# Patient Record
Sex: MALE | Race: WHITE | Employment: FULL TIME | URBAN - METROPOLITAN AREA
[De-identification: names, ages, dates, MRNs, and addresses within clinical notes are randomized per-mention and may not be internally consistent; named-entity substitution may affect disease eponyms.]

---

## 2020-07-05 ENCOUNTER — HOSPITAL ENCOUNTER (OUTPATIENT)
Dept: PREADMISSION TESTING | Age: 30
Setting detail: SPECIMEN
Discharge: HOME | End: 2020-07-09
Payer: COMMERCIAL

## 2020-07-05 PROCEDURE — U0004 COV-19 TEST NON-CDC HGH THRU: HCPCS

## 2020-07-07 ENCOUNTER — TELEPHONE (OUTPATIENT)
Dept: PRIMARY CARE CLINIC | Age: 30
End: 2020-07-07

## 2020-07-07 LAB
SARS-COV-2, PCR: NOT DETECTED
SARS-COV-2, RAPID: NORMAL
SARS-COV-2: NORMAL
SOURCE: NORMAL

## 2020-07-08 ENCOUNTER — ANESTHESIA EVENT (OUTPATIENT)
Dept: OPERATING ROOM | Age: 30
End: 2020-07-08
Payer: COMMERCIAL

## 2020-07-09 ENCOUNTER — HOSPITAL ENCOUNTER (OUTPATIENT)
Age: 30
Setting detail: OUTPATIENT SURGERY
Discharge: HOME | End: 2020-07-09
Attending: ORTHOPAEDIC SURGERY | Admitting: ORTHOPAEDIC SURGERY
Payer: COMMERCIAL

## 2020-07-09 ENCOUNTER — ANESTHESIA (OUTPATIENT)
Dept: OPERATING ROOM | Age: 30
End: 2020-07-09
Payer: COMMERCIAL

## 2020-07-09 VITALS
TEMPERATURE: 96.8 F | SYSTOLIC BLOOD PRESSURE: 132 MMHG | HEART RATE: 85 BPM | BODY MASS INDEX: 29.62 KG/M2 | DIASTOLIC BLOOD PRESSURE: 97 MMHG | WEIGHT: 200 LBS | RESPIRATION RATE: 12 BRPM | OXYGEN SATURATION: 96 % | HEIGHT: 69 IN

## 2020-07-09 VITALS — DIASTOLIC BLOOD PRESSURE: 101 MMHG | OXYGEN SATURATION: 100 % | SYSTOLIC BLOOD PRESSURE: 131 MMHG | TEMPERATURE: 96.8 F

## 2020-07-09 PROCEDURE — C1713 ANCHOR/SCREW BN/BN,TIS/BN: HCPCS | Performed by: ORTHOPAEDIC SURGERY

## 2020-07-09 PROCEDURE — 3600000013 HC SURGERY LEVEL 3 ADDTL 15MIN: Performed by: ORTHOPAEDIC SURGERY

## 2020-07-09 PROCEDURE — 7100000001 HC PACU RECOVERY - ADDTL 15 MIN: Performed by: ORTHOPAEDIC SURGERY

## 2020-07-09 PROCEDURE — 6360000002 HC RX W HCPCS: Performed by: ORTHOPAEDIC SURGERY

## 2020-07-09 PROCEDURE — 3700000000 HC ANESTHESIA ATTENDED CARE: Performed by: ORTHOPAEDIC SURGERY

## 2020-07-09 PROCEDURE — 6360000002 HC RX W HCPCS: Performed by: NURSE ANESTHETIST, CERTIFIED REGISTERED

## 2020-07-09 PROCEDURE — 2720000010 HC SURG SUPPLY STERILE: Performed by: ORTHOPAEDIC SURGERY

## 2020-07-09 PROCEDURE — C9290 INJ, BUPIVACAINE LIPOSOME: HCPCS | Performed by: ORTHOPAEDIC SURGERY

## 2020-07-09 PROCEDURE — 2580000003 HC RX 258: Performed by: ORTHOPAEDIC SURGERY

## 2020-07-09 PROCEDURE — 2580000003 HC RX 258: Performed by: NURSE ANESTHETIST, CERTIFIED REGISTERED

## 2020-07-09 PROCEDURE — 7100000000 HC PACU RECOVERY - FIRST 15 MIN: Performed by: ORTHOPAEDIC SURGERY

## 2020-07-09 PROCEDURE — 3600000003 HC SURGERY LEVEL 3 BASE: Performed by: ORTHOPAEDIC SURGERY

## 2020-07-09 PROCEDURE — 6360000002 HC RX W HCPCS: Performed by: ANESTHESIOLOGY

## 2020-07-09 PROCEDURE — 2500000003 HC RX 250 WO HCPCS: Performed by: ORTHOPAEDIC SURGERY

## 2020-07-09 PROCEDURE — 2500000003 HC RX 250 WO HCPCS: Performed by: NURSE ANESTHETIST, CERTIFIED REGISTERED

## 2020-07-09 PROCEDURE — 7100000010 HC PHASE II RECOVERY - FIRST 15 MIN: Performed by: ORTHOPAEDIC SURGERY

## 2020-07-09 PROCEDURE — 2709999900 HC NON-CHARGEABLE SUPPLY: Performed by: ORTHOPAEDIC SURGERY

## 2020-07-09 PROCEDURE — 2580000003 HC RX 258: Performed by: ANESTHESIOLOGY

## 2020-07-09 PROCEDURE — 7100000011 HC PHASE II RECOVERY - ADDTL 15 MIN: Performed by: ORTHOPAEDIC SURGERY

## 2020-07-09 PROCEDURE — 3700000001 HC ADD 15 MINUTES (ANESTHESIA): Performed by: ORTHOPAEDIC SURGERY

## 2020-07-09 DEVICE — QFIX 1.8 MINI SUTURE ANCHOR
Type: IMPLANTABLE DEVICE | Site: SHOULDER | Status: FUNCTIONAL
Brand: Q-FIX

## 2020-07-09 RX ORDER — SODIUM CHLORIDE 0.9 % (FLUSH) 0.9 %
10 SYRINGE (ML) INJECTION EVERY 12 HOURS SCHEDULED
Status: DISCONTINUED | OUTPATIENT
Start: 2020-07-09 | End: 2020-07-09 | Stop reason: HOSPADM

## 2020-07-09 RX ORDER — ONDANSETRON 2 MG/ML
INJECTION INTRAMUSCULAR; INTRAVENOUS PRN
Status: DISCONTINUED | OUTPATIENT
Start: 2020-07-09 | End: 2020-07-09 | Stop reason: SDUPTHER

## 2020-07-09 RX ORDER — HYDROMORPHONE HCL 110MG/55ML
0.25 PATIENT CONTROLLED ANALGESIA SYRINGE INTRAVENOUS EVERY 5 MIN PRN
Status: DISCONTINUED | OUTPATIENT
Start: 2020-07-09 | End: 2020-07-09 | Stop reason: HOSPADM

## 2020-07-09 RX ORDER — SODIUM CHLORIDE, SODIUM LACTATE, POTASSIUM CHLORIDE, CALCIUM CHLORIDE 600; 310; 30; 20 MG/100ML; MG/100ML; MG/100ML; MG/100ML
INJECTION, SOLUTION INTRAVENOUS CONTINUOUS PRN
Status: DISCONTINUED | OUTPATIENT
Start: 2020-07-09 | End: 2020-07-09 | Stop reason: SDUPTHER

## 2020-07-09 RX ORDER — FENTANYL CITRATE 50 UG/ML
INJECTION, SOLUTION INTRAMUSCULAR; INTRAVENOUS PRN
Status: DISCONTINUED | OUTPATIENT
Start: 2020-07-09 | End: 2020-07-09 | Stop reason: SDUPTHER

## 2020-07-09 RX ORDER — BUPIVACAINE HYDROCHLORIDE AND EPINEPHRINE 2.5; 5 MG/ML; UG/ML
INJECTION, SOLUTION EPIDURAL; INFILTRATION; INTRACAUDAL; PERINEURAL
Status: DISCONTINUED
Start: 2020-07-09 | End: 2020-07-09 | Stop reason: HOSPADM

## 2020-07-09 RX ORDER — DOCUSATE SODIUM 100 MG/1
100 CAPSULE, LIQUID FILLED ORAL 2 TIMES DAILY
Qty: 30 CAPSULE | Refills: 0 | Status: SHIPPED | OUTPATIENT
Start: 2020-07-09

## 2020-07-09 RX ORDER — ONDANSETRON 2 MG/ML
4 INJECTION INTRAMUSCULAR; INTRAVENOUS
Status: DISCONTINUED | OUTPATIENT
Start: 2020-07-09 | End: 2020-07-09 | Stop reason: HOSPADM

## 2020-07-09 RX ORDER — LIDOCAINE HYDROCHLORIDE 10 MG/ML
1 INJECTION, SOLUTION EPIDURAL; INFILTRATION; INTRACAUDAL; PERINEURAL
Status: DISCONTINUED | OUTPATIENT
Start: 2020-07-10 | End: 2020-07-09 | Stop reason: HOSPADM

## 2020-07-09 RX ORDER — BUPIVACAINE HYDROCHLORIDE 5 MG/ML
INJECTION, SOLUTION EPIDURAL; INTRACAUDAL
Status: DISCONTINUED
Start: 2020-07-09 | End: 2020-07-09 | Stop reason: HOSPADM

## 2020-07-09 RX ORDER — FENTANYL CITRATE 50 UG/ML
25 INJECTION, SOLUTION INTRAMUSCULAR; INTRAVENOUS EVERY 5 MIN PRN
Status: DISCONTINUED | OUTPATIENT
Start: 2020-07-09 | End: 2020-07-09 | Stop reason: HOSPADM

## 2020-07-09 RX ORDER — SEVOFLURANE 250 ML/250ML
LIQUID RESPIRATORY (INHALATION)
Status: DISCONTINUED
Start: 2020-07-09 | End: 2020-07-09 | Stop reason: HOSPADM

## 2020-07-09 RX ORDER — OXYCODONE HYDROCHLORIDE AND ACETAMINOPHEN 5; 325 MG/1; MG/1
1-2 TABLET ORAL EVERY 4 HOURS PRN
Qty: 50 TABLET | Refills: 0 | Status: SHIPPED | OUTPATIENT
Start: 2020-07-09 | End: 2020-07-16

## 2020-07-09 RX ORDER — SODIUM CHLORIDE, SODIUM LACTATE, POTASSIUM CHLORIDE, CALCIUM CHLORIDE 600; 310; 30; 20 MG/100ML; MG/100ML; MG/100ML; MG/100ML
INJECTION, SOLUTION INTRAVENOUS CONTINUOUS
Status: DISCONTINUED | OUTPATIENT
Start: 2020-07-10 | End: 2020-07-09 | Stop reason: HOSPADM

## 2020-07-09 RX ORDER — CEFAZOLIN SODIUM 2 G/50ML
2 SOLUTION INTRAVENOUS ONCE
Status: COMPLETED | OUTPATIENT
Start: 2020-07-09 | End: 2020-07-09

## 2020-07-09 RX ORDER — ONDANSETRON 4 MG/1
4 TABLET, FILM COATED ORAL EVERY 6 HOURS PRN
Qty: 30 TABLET | Refills: 1 | Status: SHIPPED | OUTPATIENT
Start: 2020-07-09

## 2020-07-09 RX ORDER — SODIUM CHLORIDE 9 MG/ML
INJECTION, SOLUTION INTRAVENOUS CONTINUOUS
Status: DISCONTINUED | OUTPATIENT
Start: 2020-07-10 | End: 2020-07-09 | Stop reason: HOSPADM

## 2020-07-09 RX ORDER — ROCURONIUM BROMIDE 10 MG/ML
INJECTION, SOLUTION INTRAVENOUS PRN
Status: DISCONTINUED | OUTPATIENT
Start: 2020-07-09 | End: 2020-07-09 | Stop reason: SDUPTHER

## 2020-07-09 RX ORDER — HYDROMORPHONE HCL 110MG/55ML
0.25 PATIENT CONTROLLED ANALGESIA SYRINGE INTRAVENOUS EVERY 5 MIN PRN
Status: COMPLETED | OUTPATIENT
Start: 2020-07-09 | End: 2020-07-09

## 2020-07-09 RX ORDER — DEXAMETHASONE SODIUM PHOSPHATE 10 MG/ML
INJECTION INTRAMUSCULAR; INTRAVENOUS PRN
Status: DISCONTINUED | OUTPATIENT
Start: 2020-07-09 | End: 2020-07-09 | Stop reason: SDUPTHER

## 2020-07-09 RX ORDER — SODIUM CHLORIDE 9 MG/ML
INJECTION INTRAVENOUS
Status: DISCONTINUED
Start: 2020-07-09 | End: 2020-07-09 | Stop reason: HOSPADM

## 2020-07-09 RX ORDER — SODIUM CHLORIDE 0.9 % (FLUSH) 0.9 %
10 SYRINGE (ML) INJECTION PRN
Status: DISCONTINUED | OUTPATIENT
Start: 2020-07-09 | End: 2020-07-09 | Stop reason: HOSPADM

## 2020-07-09 RX ORDER — PROPOFOL 10 MG/ML
INJECTION, EMULSION INTRAVENOUS PRN
Status: DISCONTINUED | OUTPATIENT
Start: 2020-07-09 | End: 2020-07-09 | Stop reason: SDUPTHER

## 2020-07-09 RX ADMIN — Medication 100 MCG: at 14:32

## 2020-07-09 RX ADMIN — HYDROMORPHONE HYDROCHLORIDE 0.25 MG: 2 INJECTION INTRAMUSCULAR; INTRAVENOUS; SUBCUTANEOUS at 16:55

## 2020-07-09 RX ADMIN — CEFAZOLIN SODIUM 2 G: 2 SOLUTION INTRAVENOUS at 14:27

## 2020-07-09 RX ADMIN — HYDROMORPHONE HYDROCHLORIDE 0.25 MG: 2 INJECTION INTRAMUSCULAR; INTRAVENOUS; SUBCUTANEOUS at 17:00

## 2020-07-09 RX ADMIN — SODIUM CHLORIDE, POTASSIUM CHLORIDE, SODIUM LACTATE AND CALCIUM CHLORIDE: 600; 310; 30; 20 INJECTION, SOLUTION INTRAVENOUS at 14:27

## 2020-07-09 RX ADMIN — SODIUM CHLORIDE, POTASSIUM CHLORIDE, SODIUM LACTATE AND CALCIUM CHLORIDE: 600; 310; 30; 20 INJECTION, SOLUTION INTRAVENOUS at 11:05

## 2020-07-09 RX ADMIN — PROPOFOL 200 MG: 10 INJECTION, EMULSION INTRAVENOUS at 14:32

## 2020-07-09 RX ADMIN — HYDROMORPHONE HYDROCHLORIDE 0.25 MG: 2 INJECTION INTRAMUSCULAR; INTRAVENOUS; SUBCUTANEOUS at 16:40

## 2020-07-09 RX ADMIN — HYDROMORPHONE HYDROCHLORIDE 1 MG: 2 INJECTION INTRAMUSCULAR; INTRAVENOUS; SUBCUTANEOUS at 15:50

## 2020-07-09 RX ADMIN — HYDROMORPHONE HYDROCHLORIDE 0.25 MG: 2 INJECTION INTRAMUSCULAR; INTRAVENOUS; SUBCUTANEOUS at 16:33

## 2020-07-09 RX ADMIN — HYDROMORPHONE HYDROCHLORIDE 1 MG: 2 INJECTION INTRAMUSCULAR; INTRAVENOUS; SUBCUTANEOUS at 15:18

## 2020-07-09 RX ADMIN — DEXAMETHASONE SODIUM PHOSPHATE 10 MG: 10 INJECTION INTRAMUSCULAR; INTRAVENOUS at 14:35

## 2020-07-09 RX ADMIN — ROCURONIUM BROMIDE 50 MG: 10 INJECTION, SOLUTION INTRAVENOUS at 14:32

## 2020-07-09 RX ADMIN — ONDANSETRON 4 MG: 2 INJECTION, SOLUTION INTRAMUSCULAR; INTRAVENOUS at 15:44

## 2020-07-09 ASSESSMENT — PAIN DESCRIPTION - PAIN TYPE
TYPE: SURGICAL PAIN
TYPE: SURGICAL PAIN

## 2020-07-09 ASSESSMENT — PAIN SCALES - GENERAL
PAINLEVEL_OUTOF10: 2
PAINLEVEL_OUTOF10: 8

## 2020-07-09 ASSESSMENT — PAIN DESCRIPTION - LOCATION
LOCATION: SHOULDER
LOCATION: SHOULDER

## 2020-07-09 NOTE — H&P
History and Physical Update    Pt Name: Hema Billings  MRN: [id number]  YOB: 1990  Date of evaluation: 7/9/2020    [x] I have examined the patient and reviewed the H&P/Consult and there are no changes to the patient or plans.     [] I have examined the patient and reviewed the H&P/Consult and have noted the following changes:        Mey Alejandre MD   Electronically signed 7/9/2020 at 12:10 PM

## 2020-07-09 NOTE — OP NOTE
Operative Note      Patient: Yuriy Jefferson  YOB: 1990  MRN: [id number]    Date of Procedure: 7/9/2020    Pre-Op Diagnosis: DX BICEPS TENDONITIS RIGHT SHOULDER    Post-Op Diagnosis: Posterior labral tear with biceps tendinitis       Procedure(s):  RIGHT SHOULDER ARTHROSCOPY WITH OPEN SUBPEC BICEPS TENODESIS POSTERIOR LABRAL RECONSTRUCTION       WARNER AND NEPHEW suprascapular nerve block    Surgeon(s):  Galen wKok MD    Assistant:   Surgical Assistant: Lety Erickson  Resident: Sona Haney DO    Anesthesia: General    Estimated Blood Loss (mL): Minimal    Complications: None    Specimens:   * No specimens in log *    Implants:  Implant Name Type Inv. Item Serial No.  Lot No. LRB No. Used Action   ANCHOR QFIX MINI 1.8MM Fastener ANCHOR QFIX MINI 1.8MM  WARNER AND NEPHEW [id number] Right 3 Implanted         Drains: * No LDAs found *    Findings: Patient had a very subtle tear of the posterior inferior labrum with a cam lesion present. Detailed Description of Procedure: This is a 77-year-old male that had a longstanding history of right shoulder pain. He has had a prior arthroscopy that failed to provide relief of the symptoms. Continue to complain of pain located over the anterior shoulder. He also had occasional clicking present in the shoulder. It is felt that the patient would benefit from a diagnostic arthroscopy with an open sub-pec biceps tenodesis. After informed consent was obtained the patient was brought to the operating room placed supine on the operating room table. He was placed under general endotracheal anesthesia. He was then positioned in the lateral decubitus position with the right shoulder up. He was secured in place using a beanbag and tape. An axillary roll was placed 1 handbreadth below his axilla. All of his bony prominences were padded. An examination under anesthesia was performed. The patient had full passive range of motion.   He did have some grade 1 laxity with a click present with a posterior load-and-shift. There was no instability anteriorly. The patient's arm was then cleaned with a chlorhexidine soap and a hydrogen peroxide solution. The arm was impaired with a ChloraPrep solution after 3 minutes of drying time was draped in sterile fashion. After the arm was prepped and draped a timeout was completed. After timeout was completed the arm was placed in 10 pounds of traction. A spinal needle was inserted just posterior to the [facility] joint and a suprascapular nerve block was performed using our Exparel and Marcaine solution. We then created a posterior arthroscopic portal and a diagnostic arthroscopy the joint was performed. The patient's rotator interval was normal.  His anterior superior and anterior inferior labrum looked good. The subscapularis was normal.  His articular cartilage on the humerus looked good anteriorly. He did have some scuffing of the posterior articular cartilage. Looking at his posterior labrum inferiorly it looked good, but as we came up the posterior labrum he did have evidence of a cam lesion. He also had some capsular redundancy present posteriorly. It was felt that this was a symptomatic lesion and repair was necessary. An anterior arthroscopic portal was created with an 8.25 mm cannula. An accessory viewing arthroscopic portal was created anterior superior. A 5 mm cannula was placed posteriorly. A labral elevator was introduced into the cam lesion and the labrum was detached. The labral tear was approximately 2 cm long. A rasp was placed within the labral defect and the bone was prepared. Rodney Linh was also used to create some bleeding bone. We then placed 2Q fix suture anchors on the glenoid rim next to our defect. Using a 30 degree ideal suture retriever a slight posterior capsular shift and labral reconstruction were performed.   The sutures were tied around the labrum giving us an excellent repair and re-creating her bumper with a capsular shift. After that was done final pictures were taken. We then came anteriorly and a biceps tenotomy was performed. The biceps stump was debrided. The instruments were removed from the shoulder and an incision was created just at the inferior aspect of the pectoralis major in his axilla. A 2 and half centimeter incision was created and dissection was carried down inferior to the pectoralis. Digital palpation I was able to feel the biceps tendon just medial to the pectoralis major attachment site. The biceps tendon was retrieved out the incision. A single Q fix suture anchor was placed at the inferior aspect of the bicipital groove and a weave was placed through the muscle tendon junction of the biceps. This ensured that the muscle tendon junction of the biceps was located at the inferior aspect of the pectoralis major. The biceps tenodesis was performed in this area and excellent stability was obtained. The wound was then thoroughly irrigated and the skin was closed with a 2-0 Vicryl suture followed by a 3 -o V lock suture. Dermabond was placed on the skin. The incisions were all injected with our Exparel and Marcaine solution. The patient was extubated and transported to recovery in stable condition.     Electronically signed by Mey Alejandre MD on 7/9/2020 at 4:00 PM

## 2020-07-09 NOTE — ANESTHESIA PRE PROCEDURE
Department of Anesthesiology  Preprocedure Note       Name:  Kenny Diamond   Age:  34 y.o.  :  1990                                          MRN:  [id number]         Date:  2020      Surgeon: Disha Lawton):  Shellee Litten, MD    Procedure: Procedure(s):  RIGHT SHOULDER ARTHROSCOPY WITH OPEN SUBPEC BICEPS TENODESIS        WARNER AND NEPHEW    Medications prior to admission:   Prior to Admission medications    Not on File       Current medications:    Current Facility-Administered Medications   Medication Dose Route Frequency Provider Last Rate Last Dose    ceFAZolin (ANCEF) 2 g in dextrose 3 % 50 mL IVPB (duplex)  2 g Intravenous Once Shellee Litten, MD        [START ON 7/10/2020] 0.9 % sodium chloride infusion   Intravenous Continuous Nghia Grimes DO        [START ON 7/10/2020] lactated ringers infusion   Intravenous Continuous Nghia Grimes DO        sodium chloride flush 0.9 % injection 10 mL  10 mL Intravenous 2 times per day Nghia Grimes, DO        sodium chloride flush 0.9 % injection 10 mL  10 mL Intravenous PRN Tin Cooper DO        [START ON 7/10/2020] lidocaine PF 1 % injection 1 mL  1 mL Intradermal Once PRN Nghia Grimes DO        fentaNYL (SUBLIMAZE) injection 25 mcg  25 mcg Intravenous Q5 Min PRN Ru Mcelroy MD        HYDROmorphone (DILAUDID) injection 0.25 mg  0.25 mg Intravenous Q5 Min PRN Ru Mcelory MD        [facility]ansetron [facility]) injection 4 mg  4 mg Intravenous Once PRN Ru Mcelroy MD           Allergies:  No Known Allergies    Problem List:  There is no problem list on file for this patient. Past Medical History:  No past medical history on file. Past Surgical History:  No past surgical history on file.     Social History:    Social History     Tobacco Use    Smoking status: Not on file   Substance Use Topics    Alcohol use: Not on file                                Counseling given: Not Answered      Vital Signs (Current): Vitals:    07/09/20 1034 07/09/20 1035   BP:  133/70   Pulse:  67   Resp:  20   Temp: 97.3 °F (36.3 °C)    TempSrc: Temporal    SpO2:  97%                                              BP Readings from Last 3 Encounters:   07/09/20 133/70       NPO Status:                                                                                 BMI:   Wt Readings from Last 3 Encounters:   No data found for Wt     There is no height or weight on file to calculate BMI.    CBC: No results found for: WBC, RBC, HGB, HCT, MCV, RDW, PLT    CMP: No results found for: NA, K, CL, CO2, BUN, CREATININE, GFRAA, AGRATIO, LABGLOM, GLUCOSE, PROT, CALCIUM, BILITOT, ALKPHOS, AST, ALT    POC Tests: No results for input(s): POCGLU, POCNA, POCK, POCCL, POCBUN, POCHEMO, POCHCT in the last 72 hours. Coags: No results found for: PROTIME, INR, APTT    HCG (If Applicable): No results found for: PREGTESTUR, PREGSERUM, HCG, HCGQUANT     ABGs: No results found for: PHART, PO2ART, JOE5XBT, RYI4NAQ, BEART, W3EOZBQZ     Type & Screen (If Applicable):  No results found for: LABABO, LABRH    Drug/Infectious Status (If Applicable):  No results found for: HIV, HEPCAB    COVID-19 Screening (If Applicable):   Lab Results   Component Value Date    COVID19 Not Detected 07/05/2020         Anesthesia Evaluation   no history of anesthetic complications:   Airway: Mallampati: I  TM distance: >3 FB   Neck ROM: full  Mouth opening: > = 3 FB Dental:          Pulmonary:Negative Pulmonary ROS and normal exam                               Cardiovascular:Negative CV ROS  Exercise tolerance: good (>4 METS),       (-)  angina       Beta Blocker:  Not on Beta Blocker         Neuro/Psych:   Negative Neuro/Psych ROS              GI/Hepatic/Renal: Neg GI/Hepatic/Renal ROS            Endo/Other: Negative Endo/Other ROS                    Abdominal:           Vascular: negative vascular ROS.                                        Anesthesia Plan      general     ASA 1 (Ett)  Induction: intravenous. MIPS: Postoperative opioids intended and Prophylactic antiemetics administered. Anesthetic plan and risks discussed with patient. Plan discussed with CRNA.     Attending anesthesiologist reviewed and agrees with Savanna العراقي MD   7/9/2020

## 2021-04-27 ENCOUNTER — HOSPITAL ENCOUNTER (EMERGENCY)
Facility: CLINIC | Age: 31
Discharge: HOME | End: 2021-04-27
Attending: EMERGENCY MEDICINE
Payer: MEDICARE

## 2021-04-27 ENCOUNTER — APPOINTMENT (OUTPATIENT)
Dept: GENERAL RADIOLOGY | Facility: CLINIC | Age: 31
End: 2021-04-27
Payer: MEDICARE

## 2021-04-27 VITALS
OXYGEN SATURATION: 98 % | HEART RATE: 83 BPM | DIASTOLIC BLOOD PRESSURE: 75 MMHG | RESPIRATION RATE: 16 BRPM | SYSTOLIC BLOOD PRESSURE: 140 MMHG | HEIGHT: 68 IN | WEIGHT: 185 LBS | BODY MASS INDEX: 28.04 KG/M2 | TEMPERATURE: 98.5 F

## 2021-04-27 DIAGNOSIS — S86.912A STRAIN OF LEFT KNEE, INITIAL ENCOUNTER: Primary | ICD-10-CM

## 2021-04-27 PROCEDURE — 73562 X-RAY EXAM OF KNEE 3: CPT

## 2021-04-27 PROCEDURE — 99283 EMERGENCY DEPT VISIT LOW MDM: CPT

## 2021-04-27 ASSESSMENT — PAIN DESCRIPTION - PAIN TYPE: TYPE: ACUTE PAIN

## 2021-04-27 ASSESSMENT — ENCOUNTER SYMPTOMS
BACK PAIN: 1
ABDOMINAL PAIN: 0
SHORTNESS OF BREATH: 0

## 2021-04-27 ASSESSMENT — PAIN DESCRIPTION - LOCATION: LOCATION: KNEE

## 2021-04-27 ASSESSMENT — PAIN DESCRIPTION - ORIENTATION: ORIENTATION: LEFT

## 2021-04-27 ASSESSMENT — PAIN SCALES - GENERAL: PAINLEVEL_OUTOF10: 3

## 2021-04-27 NOTE — ED PROVIDER NOTES
[address] ED  EMERGENCY DEPARTMENT ENCOUNTER      Pt Name: Brianna Grajeda  MRN: [id number]  [place] 1990  Date of evaluation: 4/27/2021  Provider: Diane Davalos MD    CHIEF COMPLAINT     Chief Complaint   Patient presents with    Knee Pain     left knee pain, pt states works at [employer] and is on his knees often. Pt states the other day he was jumping for concepción due to good news and now has worsening left knee pain         HISTORY OF PRESENT ILLNESS   (Location/Symptom, Timing/Onset, Context/Setting,Quality, Duration, Modifying Factors, Severity)  Note limiting factors. Brianna Grajeda is a 27 y.o. male who presents to the emergency department with a chief complaint of left knee pain. Patient states he started working at [address] [employer] [address] recently and has been at work for 3 days. His job involves having to kneel down for prolonged. Of time and he has had pain in both knees but it is persistent in the infrapatellar aspect of the left knee. Pain is reproducible with mechanical factors and he has had it for the last 3 days. The history is provided by the patient. Nursing Notes werereviewed. REVIEW OF SYSTEMS    (2-9 systems for level 4, 10 or more for level 5)     Review of Systems   Constitutional: Negative for fever. Respiratory: Negative for shortness of breath. Gastrointestinal: Negative for abdominal pain. Musculoskeletal: Positive for back pain. All other systems reviewed and are negative. Except as noted above the remainder of the review of systems was reviewed and negative. PAST MEDICAL HISTORY   History reviewed. No pertinent past medical history.       SURGICALHISTORY       Past Surgical History:   Procedure Laterality Date    OTHER SURGICAL HISTORY      bursa surgery     SHOULDER ARTHROSCOPY Right 07/09/2020    RIGHT SHOULDER ARTHROSCOPY WITH OPEN SUBPEC BICEPS TENODESIS POSTERIOR LABRAL RECONSTRUCTION       WARNER AND NEPHEW (Right Shoulder)    SHOULDER ARTHROSCOPY Right 7/9/2020    RIGHT SHOULDER ARTHROSCOPY WITH OPEN SUBPEC BICEPS TENODESIS POSTERIOR LABRAL RECONSTRUCTION       WARNER AND NEPHLEXA performed by Bria Patel MD at [facility] Medication List as of 4/27/2021  9:48 AM      CONTINUE these medications which have NOT CHANGED    Details   Multiple Vitamin (MULTIVITAMINS PO) Take by mouthHistorical Med      docusate sodium (COLACE) 100 MG capsule Take 1 capsule by mouth 2 times daily, Disp-30 capsule, R-0Print      ondansetron (ZOFRAN) 4 MG tablet Take 1 tablet by mouth every 6 hours as needed for Nausea, Disp-30 tablet, R-1Print             ALLERGIES     Patient has no known allergies. FAMILY HISTORY     History reviewed. No pertinent family history.        SOCIAL HISTORY       Social History     Socioeconomic History    Marital status: Single     Spouse name: None    Number of children: None    Years of education: None    Highest education level: None   Occupational History    None   Social Needs    Financial resource strain: None    Food insecurity     Worry: None     Inability: None    Transportation needs     Medical: None     Non-medical: None   Tobacco Use    Smoking status: Never Smoker    Smokeless tobacco: Never Used   Substance and Sexual Activity    Alcohol use: Not Currently    Drug use: Not Currently    Sexual activity: None   Lifestyle    Physical activity     Days per week: None     Minutes per session: None    Stress: None   Relationships    Social connections     Talks on phone: None     Gets together: None     Attends Rastafari service: None     Active member of club or organization: None     Attends meetings of clubs or organizations: None     Relationship status: None    Intimate partner violence     Fear of current or ex partner: None     Emotionally abused: None     Physically abused: None     Forced sexual activity: None   Other Topics Concern    None   Social History Narrative    None       SCREENINGS             PHYSICAL EXAM    (up to 7 for level 4, 8 or more for level 5)     ED Triage Vitals [04/27/21 0824]   BP Temp Temp Source Pulse Resp SpO2 Height Weight   (!) 140/75 98.5 °F (36.9 °C) Oral 83 16 98 % 5' 8\" (1.727 m) 185 lb (83.9 kg)       Physical Exam  Vitals signs reviewed. Constitutional:       General: He is not in acute distress. Musculoskeletal:      Comments: Both knees are examined. Patient does not have joint effusion in the left knee. Range of motion is accompanied with pain and he is locally tender over the lateral tibial plateau on the left side. There is no underlying bony crepitus and the knee is stable to stress. The right knee is unremarkable on exam.   Skin:     General: Skin is warm and dry. Neurological:      Mental Status: He is alert. DIAGNOSTIC RESULTS     EKG: All EKG's are interpreted by the Emergency Department Physician who either signs orCo-signs this chart in the absence of a cardiologist.    RADIOLOGY:     Interpretation per the Radiologist below, ifavailable at the time of this note:    XR KNEE LEFT (3 VIEWS)   Final Result   Negative left knee. ED BEDSIDE ULTRASOUND:   Performed by ED Physician - none    LABS:  Labs Reviewed - No data to display    All other labs were within normal range ornot returned as of this dictation. EMERGENCY DEPARTMENT COURSE and DIFFERENTIAL DIAGNOSIS/MDM:   Vitals:    Vitals:    04/27/21 0824   BP: (!) 140/75   Pulse: 83   Resp: 16   Temp: 98.5 °F (36.9 °C)   TempSrc: Oral   SpO2: 98%   Weight: 83.9 kg (185 lb)   Height: 5' 8\" (1.727 m)            Knee x-rays are nondiagnostic. Patient is advised to get kneepads for when he works and he is placed on topical Voltaren gel. MDM    CONSULTS:  None    PROCEDURES:  Unlessotherwise noted below, none     Procedures    FINAL IMPRESSION      1.  Strain of left knee, initial encounter          DISPOSITION/PLAN   DISPOSITION Decision To Discharge 04/27/2021 09:45:07 AM      PATIENT REFERRED TO:  Anita Carrel, MD  [address] [place] [address]  [phone]            DISCHARGE MEDICATIONS:         Problem List:  There is no problem list on file for this patient. Summation      Patient Course: Discharged. ED Medicationsadministered this visit:  Medications - No data to display    New Prescriptions from this visit:    Discharge Medication List as of 4/27/2021  9:48 AM      START taking these medications    Details   diclofenac sodium (VOLTAREN) 1 % GEL Apply 2 g topically 2 times daily Apply to left knee., Topical, 2 TIMES DAILY Starting Tue 4/27/2021, Disp-50 g, R-0, Print             Follow-up:  Anita Carrel, MD  [address] [place] [address]  [phone]              Final Impression:   1.  Strain of left knee, initial encounter               (Please note that portions of this note were completed with a voice recognitionprogram.  Efforts were made to edit the dictations but occasionally words are mis-transcribed.)    Chrissie Cortes MD (electronically signed)  Attending Emergency Physician            Chrissie Cortes MD  04/27/21

## 2021-11-09 ENCOUNTER — HOSPITAL ENCOUNTER (EMERGENCY)
Facility: CLINIC | Age: 31
Discharge: HOME | End: 2021-11-10
Attending: SPECIALIST
Payer: MEDICARE

## 2021-11-09 VITALS
SYSTOLIC BLOOD PRESSURE: 145 MMHG | HEART RATE: 88 BPM | DIASTOLIC BLOOD PRESSURE: 99 MMHG | RESPIRATION RATE: 16 BRPM | TEMPERATURE: 98.2 F | OXYGEN SATURATION: 94 %

## 2021-11-09 DIAGNOSIS — H60.501 ACUTE OTITIS EXTERNA OF RIGHT EAR, UNSPECIFIED TYPE: Primary | ICD-10-CM

## 2021-11-09 PROCEDURE — 99283 EMERGENCY DEPT VISIT LOW MDM: CPT

## 2021-11-09 ASSESSMENT — PAIN DESCRIPTION - DESCRIPTORS: DESCRIPTORS: ACHING

## 2021-11-09 ASSESSMENT — PAIN DESCRIPTION - ONSET: ONSET: ON-GOING

## 2021-11-09 ASSESSMENT — PAIN SCALES - GENERAL: PAINLEVEL_OUTOF10: 7

## 2021-11-09 ASSESSMENT — PAIN DESCRIPTION - LOCATION: LOCATION: EAR

## 2021-11-09 ASSESSMENT — PAIN DESCRIPTION - PAIN TYPE: TYPE: ACUTE PAIN

## 2021-11-09 ASSESSMENT — PAIN DESCRIPTION - FREQUENCY: FREQUENCY: CONTINUOUS

## 2021-11-09 ASSESSMENT — PAIN DESCRIPTION - PROGRESSION: CLINICAL_PROGRESSION: NOT CHANGED

## 2021-11-10 NOTE — ED NOTES
Patient to ED via self ambulatory to room 6  Patient here for complaint of earache  Patient states he has been having ear pain on his right side as well as his left, but worse on the right with some drainage  Patient states he was seen at an urgent care for this issue and was sent home with medication including an antihistamine and a fluticasone which patient states has not been helping  Patient denies any other complaint including CP, SOB, or N/V    Vitals obtained and call light provided  Patient resting comfortably on stretcher in no apparent distress  Respirations even and non-labored  No other needs at this time     Jeri Merino, RN  11/09/21 5800

## 2021-11-10 NOTE — ED PROVIDER NOTES
[facility]  [facility]  Phone: [phone]      Pt Name: Sue Barron  MRN: [id number]  [place] 1990  Date of evaluation: 11/9/2021      CHIEF COMPLAINT       Chief Complaint   Patient presents with   [address] [place] [address]    Sue Barron is a 27 y.o. male who presents   Chief Complaint   Patient presents with    Otalgia   . 80-year-old male patient presents to the emergency department for evaluation of right earache since 4 days prior to arrival associate with mild clear discharge. He has been using Q-tips to clean the ear canal.  He denies any cough, runny nose or sore throat. Denies any fever or chills. Hearing has been normal.  He has been taking Tylenol occasionally with slight relief. There are no exacerbating or relieving factors. Patient states he was seen at an urgent care for this issue and was sent home with medication including an antihistaminic and steroid locally which she has been taking without much relief. He denies any chest pain, shortness of breath, abdominal pain, vomiting or diarrhea. There are no exacerbating or relieving factors. REVIEW OF SYSTEMS       Review of Systems    All systems reviewed and negative unless noted in HPI. The patient denies fever or constitutional symptoms. Denies vision change. Denies any sore throat or rhinorrhea. Denies any neck pain or stiffness. Denies chest pain or shortness of breath. No nausea,  vomiting or diarrhea. Denies any dysuria. Denies urinary frequency or hematuria. Denies musculoskeletal injury or pain. Denies any weakness, numbness or focal neurologic deficit. Denies any skin rash or edema. No recent psychiatric issues. No easy bruising or bleeding. Denies any polyuria, polydypsia or history of immunocompromise. PAST MEDICAL HISTORY    has no past medical history on file.     SURGICAL HISTORY      has a past surgical history that includes other surgical history; Shoulder arthroscopy (Right, 07/09/2020); and Shoulder arthroscopy (Right, 7/9/2020). CURRENT MEDICATIONS       Previous Medications    DICLOFENAC SODIUM (VOLTAREN) 1 % GEL    Apply 2 g topically 2 times daily Apply to left knee. DOCUSATE SODIUM (COLACE) 100 MG CAPSULE    Take 1 capsule by mouth 2 times daily    MULTIPLE VITAMIN (MULTIVITAMINS PO)    Take by mouth    ONDANSETRON (ZOFRAN) 4 MG TABLET    Take 1 tablet by mouth every 6 hours as needed for Nausea       ALLERGIES     has No Known Allergies. FAMILY HISTORY     has no family status information on file. family history is not on file. SOCIAL HISTORY      reports that he has never smoked. He has never used smokeless tobacco. He reports previous alcohol use. He reports previous drug use. PHYSICAL EXAM     INITIAL VITALS:  temperature is 98.2 °F (36.8 °C). His blood pressure is 145/99 (abnormal) and his pulse is 88. His respiration is 16 and oxygen saturation is 94%. Physical Exam  Vitals and nursing note reviewed. Constitutional:       Appearance: He is well-developed. HENT:      Head: Normocephalic and atraumatic. Right Ear: Tympanic membrane normal. Tenderness present. Left Ear: Tympanic membrane and ear canal normal.      Ears:      Comments: There is mild erythema and tenderness in the right ear canal.  There is no discharge or drainage and the tympanic membrane is normal.     Nose: Nose normal.   Eyes:      Extraocular Movements: Extraocular movements intact. Pupils: Pupils are equal, round, and reactive to light. Cardiovascular:      Rate and Rhythm: Normal rate and regular rhythm. Heart sounds: Normal heart sounds. No murmur heard. Pulmonary:      Effort: Pulmonary effort is normal. No respiratory distress. Breath sounds: Normal breath sounds. Abdominal:      General: Bowel sounds are normal. There is no distension. Palpations: Abdomen is soft. Tenderness: There is no abdominal tenderness. Musculoskeletal:      Cervical back: Normal range of motion and neck supple. Skin:     General: Skin is warm and dry. Neurological:      General: No focal deficit present. Mental Status: He is alert and oriented to person, place, and time. DIFFERENTIAL DIAGNOSIS/ MDM:     Otitis externa right ear  We will treat with Cortisporin otic solution    DIAGNOSTIC RESULTS     EKG: All EKG's are interpreted by the Emergency Department Physician who either signs or Co-signs this chart in the absence of a cardiologist.    None obtained    RADIOLOGY:   I reviewed the radiologist interpretations:  No orders to display       No results found. LABS:  Labs Reviewed - No data to display      EMERGENCY DEPARTMENT COURSE:   Vitals:    Vitals:    11/09/21 2139   BP: (!) 145/99   Pulse: 88   Resp: 16   Temp: 98.2 °F (36.8 °C)   SpO2: 94%     -------------------------  BP: (!) 145/99, Temp: 98.2 °F (36.8 °C), Pulse: 88, Resp: 16    Orders Placed This Encounter   Medications    neomycin-polymyxin-hydrocortisone (CORTISPORIN) 3.5-03235-5 otic solution     Sig: Place 4 drops into the right ear 3 times daily for 10 days Instill into Right Ear     Dispense:  10 mL     Refill:  0       Patient was prescribed Cortisporin otic solution 4 drops 3 times daily in the right ear, continue Tylenol and also take ibuprofen as needed for the pain, plenty of oral fluids, follow-up with PCP, return if worse. I have reviewed the disposition diagnosis with the patient and or their family/guardian. I have answered their questions and given discharge instructions. They voiced understanding of these instructions and did not have any further questions or complaints. Re-evaluation Notes    Patient is resting comfortably and does not appear to be in any pain or distress prior to discharge      PROCEDURES:  None    FINAL IMPRESSION      1.  Acute otitis externa of right ear, unspecified type          DISPOSITION/PLAN   DISPOSITION Decision To Discharge 11/09/2021 10:28:14 PM      Condition on Disposition    Stable    PATIENT REFERRED TO:  Call 419-same-day for follow up    Call in 2 days  For reevaluation of current symptoms    [facility] ED  [address]  [phone]    If symptoms worsen      DISCHARGE MEDICATIONS:  New Prescriptions    NEOMYCIN-POLYMYXIN-HYDROCORTISONE (CORTISPORIN) 3.5-58844-1 OTIC SOLUTION    Place 4 drops into the right ear 3 times daily for 10 days Instill into Right Ear       (Please note that portions of this note were completed with a voice recognition program.  Efforts were made to edit the dictations but occasionally words are mis-transcribed.)    Irma Tsang MD,, MD, F.A.C.E.P.   Attending Emergency Physician      Irma Tsang MD  11/10/21 9835

## 2022-04-12 ENCOUNTER — APPOINTMENT (OUTPATIENT)
Dept: GENERAL RADIOLOGY | Facility: CLINIC | Age: 32
End: 2022-04-12
Payer: COMMERCIAL

## 2022-04-12 ENCOUNTER — HOSPITAL ENCOUNTER (EMERGENCY)
Facility: CLINIC | Age: 32
Discharge: HOME | End: 2022-04-12
Attending: EMERGENCY MEDICINE
Payer: COMMERCIAL

## 2022-04-12 VITALS
TEMPERATURE: 97.8 F | HEART RATE: 76 BPM | WEIGHT: 202 LBS | RESPIRATION RATE: 12 BRPM | SYSTOLIC BLOOD PRESSURE: 127 MMHG | DIASTOLIC BLOOD PRESSURE: 79 MMHG | OXYGEN SATURATION: 96 % | BODY MASS INDEX: 30.62 KG/M2 | HEIGHT: 68 IN

## 2022-04-12 DIAGNOSIS — S60.012A CONTUSION OF LEFT THUMB WITHOUT DAMAGE TO NAIL, INITIAL ENCOUNTER: Primary | ICD-10-CM

## 2022-04-12 PROCEDURE — 99283 EMERGENCY DEPT VISIT LOW MDM: CPT

## 2022-04-12 PROCEDURE — 73130 X-RAY EXAM OF HAND: CPT

## 2022-04-12 RX ORDER — NAPROXEN 500 MG/1
500 TABLET ORAL 2 TIMES DAILY WITH MEALS
Qty: 30 TABLET | Refills: 0 | Status: SHIPPED | OUTPATIENT
Start: 2022-04-12 | End: 2022-04-27

## 2022-04-12 ASSESSMENT — ENCOUNTER SYMPTOMS
VOMITING: 0
NAUSEA: 0
SHORTNESS OF BREATH: 0
SORE THROAT: 0
ABDOMINAL PAIN: 0
DIARRHEA: 0

## 2022-04-12 ASSESSMENT — PAIN DESCRIPTION - ORIENTATION: ORIENTATION: LEFT

## 2022-04-12 ASSESSMENT — PAIN SCALES - GENERAL: PAINLEVEL_OUTOF10: 9

## 2022-04-12 NOTE — ED PROVIDER NOTES
[facility]  [facility]  Phone: [phone] [facility],[place] [address]          Pt Name: Anabel Monroy  MRN: [id number]  [place] 1990  Date of evaluation: 4/12/2022      CHIEF COMPLAINT       Chief Complaint   Patient presents with    Finger Injury       HISTORY OF PRESENT ILLNESS       Anabel Monroy is a 32 y.o. male who presents with a left thumb injury. States that it was an axial load injury when he dropped a pallet and it bounced back and struck his thumb. Has pain throughout the whole thumb. Occurred a little over an hour prior to arrival at work. No other symptoms, injuries or concerns. REVIEW OF SYSTEMS       Review of Systems   Constitutional: Negative for chills and fever. HENT: Negative for sore throat. Respiratory: Negative for shortness of breath. Cardiovascular: Negative for chest pain. Gastrointestinal: Negative for abdominal pain, diarrhea, nausea and vomiting. Musculoskeletal: Negative for neck pain. Skin: Negative for rash. Neurological: Negative for weakness and numbness. PAST MEDICAL HISTORY    has no past medical history on file. SURGICAL HISTORY      has a past surgical history that includes other surgical history; Shoulder arthroscopy (Right, 07/09/2020); and Shoulder arthroscopy (Right, 7/9/2020).     CURRENT MEDICATIONS       Discharge Medication List as of 4/12/2022  4:06 AM      CONTINUE these medications which have NOT CHANGED    Details   diclofenac sodium (VOLTAREN) 1 % GEL Apply 2 g topically 2 times daily Apply to left knee., Topical, 2 TIMES DAILY Starting Tue 4/27/2021, Disp-50 g, R-0, Print      Multiple Vitamin (MULTIVITAMINS PO) Take by mouthHistorical Med      docusate sodium (COLACE) 100 MG capsule Take 1 capsule by mouth 2 times daily, Disp-30 capsule, R-0Print      ondansetron (ZOFRAN) 4 MG tablet Take 1 tablet by mouth every 6 hours as needed for Nausea, Disp-30 tablet, R-1Print             ALLERGIES     has No Known Allergies. FAMILY HISTORY     has no family status information on file. family history is not on file. SOCIAL HISTORY      reports that he has never smoked. He has never used smokeless tobacco. He reports previous alcohol use. He reports previous drug use. PHYSICAL EXAM     INITIAL VITALS:  height is 5' 8\" (1.727 m) and weight is 91.6 kg (202 lb). His oral temperature is 97.8 °F (36.6 °C). His blood pressure is 127/79 and his pulse is 76. His respiration is 12 and oxygen saturation is 96%. Physical Exam  Constitutional:       General: He is not in acute distress. Appearance: He is well-developed. HENT:      Head: Normocephalic and atraumatic. Right Ear: External ear normal.      Left Ear: External ear normal.   Eyes:      General: Lids are normal.         Right eye: No discharge. Left eye: No discharge. Neck:      Trachea: No tracheal deviation. Cardiovascular:      Rate and Rhythm: Normal rate and regular rhythm. Pulmonary:      Effort: Pulmonary effort is normal.   Musculoskeletal:      Comments: Left thumb has no deformity or edema. No ecchymosis. Otherwise unremarkable hand exam.  Range of motion normal.   Skin:     General: Skin is warm and dry. Neurological:      Mental Status: He is alert. GCS: GCS eye subscore is 4. GCS verbal subscore is 5. GCS motor subscore is 6. Psychiatric:         Behavior: Behavior normal.           DIFFERENTIAL DIAGNOSIS/ MDM:     Plan at this time will be to image the hand.   He just had ibuprofen about an hour prior to arrival.    DIAGNOSTIC RESULTS     EKG: All EKG's are interpreted by the Emergency Department Physician who either signs or Co-signs this chart in the absence of a cardiologist.        RADIOLOGY:   Interpretation per the Radiologist below, if available at the time of this note:  XR HAND LEFT (MIN 3 VIEWS)   Final Result   No acute osseous abnormality or foreign body identified. No results found. LABS:  No results found for this visit on 04/12/22. EMERGENCY DEPARTMENT COURSE:     The patient was given the following medications:  Orders Placed This Encounter   Medications    naproxen (NAPROSYN) 500 MG tablet     Sig: Take 1 tablet by mouth 2 times daily (with meals) for 30 doses     Dispense:  30 tablet     Refill:  0        Vitals:    Vitals:    04/12/22 0255   BP: 127/79   Pulse: 76   Resp: 12   Temp: 97.8 °F (36.6 °C)   TempSrc: Oral   SpO2: 96%   Weight: 91.6 kg (202 lb)   Height: 5' 8\" (1.727 m)     -------------------------  BP: 127/79, Temp: 97.8 °F (36.6 °C), Pulse: 76, Resp: 12      Re-evaluation Notes    Patient doing well on reevaluation. No acute changes. Imaging negative for fracture. Plan to be to discharge patient home. Placed in a thumb spica splint. Advised to follow-up with his PCP or return right away if worsening or for new or concerning symptoms. He is comfortable with the plan. The patient presented with hand pain. A fracture was not detected on X-ray. The wrist and elbow joints were not affected. No skin lesions were seen. There are no signs of compartment syndrome. The pulses are 2/4. The motor is 5/5. The sensation is intact. The patient was advised to return to the Emergency Department for increasing pain, numbness, weakness, or coldness to the extremity. The patient was further instructed to follow up in 2-3 days with their family doctor or orthopedics. The patient voiced understanding of these instructions. The patient understands that at this time there is no evidence for a more malignant underlying process, but the patient also understands that early in the process of an illness or injury, an emergency department workup can be falsely reassuring.   Routine discharge counseling was given, and the patient understands that worsening, changing or persistent symptoms should prompt an immediate call or follow up with their primary physician or return to the emergency department. The importance of appropriate follow up was also discussed. I have reviewed the disposition diagnosis with the patient and or their family/guardian. I have answered their questions and given discharge instructions. They voiced understanding of these instructions and did not have any further questions or complaints. CONSULTS:    None    CRITICAL CARE:     None    PROCEDURES:    None    FINAL IMPRESSION      1.  Contusion of left thumb without damage to nail, initial encounter          DISPOSITION/PLAN   DISPOSITION Decision To Discharge 04/12/2022 04:02:10 AM      Condition on Disposition    Improved    PATIENT REFERRED TO:  DO Steffany Munoz [facility]  [phone]    Schedule an appointment as soon as possible for a visit in 2 days        DISCHARGE MEDICATIONS:  Discharge Medication List as of 4/12/2022  4:06 AM      START taking these medications    Details   naproxen (NAPROSYN) 500 MG tablet Take 1 tablet by mouth 2 times daily (with meals) for 30 doses, Disp-30 tablet, R-0Normal             (Please note that portions of this note were completed with a voice recognition program.  Efforts were made to edit the dictations but occasionally words are mis-transcribed.)    Barney Paredes DO, DO  Attending Emergency Physician       Barney Paredes DO  04/12/22 0000

## 2022-04-12 NOTE — ED TRIAGE NOTES
Patient states he was stacking a palette when it released and bounced back up and jammed/ smashed  left thumb. Patient states work injury happened around 0200 a.m at patient works for [employer].

## 2022-05-31 ENCOUNTER — HOSPITAL ENCOUNTER (OUTPATIENT)
Dept: PHYSICAL THERAPY | Facility: CLINIC | Age: 32
Setting detail: THERAPIES SERIES
Discharge: HOME | End: 2022-05-31
Payer: MEDICARE

## 2022-05-31 PROCEDURE — 97110 THERAPEUTIC EXERCISES: CPT

## 2022-05-31 PROCEDURE — 97161 PT EVAL LOW COMPLEX 20 MIN: CPT

## 2022-05-31 NOTE — CONSULTS
[] [facility]) - [facility] &  Therapy  [address]  [phone]  F: [phone] [] [address]  [employer] [address]   [address]  P: [phone]  F: [phone] [] [address] &  Therapy  [address]  P: [phone]  F: [phone] [] [address]  P: [phone]  F: [phone] [x] [address]  [facility]   [address]   [place]: [phone]  F: [phone]        Physical Therapy Spine Evaluation    Date:  2022  Patient: Kj Padilla    : 1990  MRN: [id number]  Physician: Dr. Jose Manuel Rodriguez: [employer] ( vs)   Medical Diagnosis: Chronic Back pain     Rehab Codes: M54.5,  R29.3, M62.838, M62.81    Onset Date: referral 22       Next 's appt.: n/a       Subjective:   CC/HPI: Patient is a 31 y/o male presenting to PT clinic with c/o chronic upper/middle back pain that has been going on for a few years now. Patient reports that he was inactive in bed for 3 months in 2019 after a R shoulder arthroscopic surgery. He reports that he did not do rehab for his shoulder and returned to work. He reports that he started to have the upper back pain after he returned to work at his [employer] job. 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 Refrained from using his R arm secondary to hx of surgery. Denies low back and neck pain.      PMHx:   [] Unremarkable               [x] Refer to full medical chart  In EPIC     Past Surgical History         Laterality Date Comments   OTHER SURGICAL HISTORY [RVB972]   bursa surgery    Shoulder arthroscopy [NIA111] Right 2020 RIGHT SHOULDER ARTHROSCOPY WITH OPEN SUBPEC BICEPS TENODESIS POSTERIOR LABRAL RECONSTRUCTION       WARNER AND NEPHLEXA (Right Shoulder)   Shoulder arthroscopy [JCM821] Right 7/9/2020 RIGHT SHOULDER ARTHROSCOPY WITH OPEN SUBPEC BICEPS TENODESIS POSTERIOR LABRAL RECONSTRUCTION       PAMELA performed by Dario Hines MD at [facility]         Comorbidities:   [] Obesity [] Dialysis  [x] N/A   [] Asthma/COPD [] Dementia [] Other:   [] Stroke [] Sleep apnea [] Other:   [] Vascular disease [] Rheumatic disease [] Other:       Tests: [] X-Ray:   [] MRI:   [] Other:    Medications: [x] Refer to full medical record [] None [] Other:  Allergies:      [x] Refer to full medical record [] None [] Other:      ADL/IADL [x] Previously independent with all [x] Currently independent with all Who currently assists the patient with task    [] Previously independent with all except: [] Currently independent with all except:    Bathing  [] Assist [] Assist    Dress/grooming [] Assist [] Assist    Transfer/mobility [] Assist [] Assist    Feeding [] Assist [] Assist    Toileting [] Assist [] Assist    Driving [] Assist [] Assist    Housekeeping [] Assist [] Assist    Grocery shop/meal prep [] Assist [] Assist        Gait Prior level of function Current level of function    [x] Independent  [] Assist [x] Independent  [] Assist   Device: [x] Independent [x] Independent    [] Straight Cane [] Quad cane [] Straight Cane [] Quad cane    [] Standard walker [] Rolling walker   [] 4 wheeled walker [] Standard walker [] Rolling walker   [] 4 wheeled walker    [] Wheelchair [] Wheelchair       Function:  Hand Dominance  [x] Right  [] Left  Marital Status Spouse    Home type n/a   Stairs from outside n/a   Stairs inside n/a   Employement [employer]     Job status 40 hours per week   Work Activities/duties  Sitting, driving    Recreational Activities Weight lifting       Pain present?  Not at rest   Location Upper back   Pain Rating currently 0/10   Pain at worse Moderate    Pain at best 0/10    Description of pain Intermitting shooting pain from middle back to the lateral sides of the back     Altered Sensation Numbness to his upper back after sleeping all night    What makes it worse Lifting, Sleep makes it numb, reaching is \"tight\"   What makes it better Sitting upright, remaining still    Symptom progression Worsened in the last few weeks   Sleep Sleeps on his back without a pillow  Intact, pain does not affect his sleep            Objective:        STRENGTH    Left Right   C5 Shld Abd 4 4   Shld Flexion 4 4   Shld IR 5 4   Shld ER 4- 3+   C6 Elb Flex 5 5   C7 Elb Ext 5 5   C8 EPL     T1 Fing Abd               Prone      Shoulder ext 4 4-   Shoulder horiz abd  4 4-   scaption 3+ 3+                 Cervical ROM Left Right   Flexion WFL     Extension WFL     Rotation  [facility] WFL   Sidebend  Dec by 10%  WFL   Retraction      Shoulder ROM WFL     TESTS (+/-) LEFT RIGHT Not Tested   SLR supine   [x]   Hamstring (SLR)   [x]   SKTC   [x]   DKTC   [x]   Slump/Dural   [x]   SI JT   [x]   AMILCAR   [x]   Joint Mobility   [x]   Cerv. Comp   [x]   Cerv. Distraction   [x]   Cerv.  Alar/Transverse   [x]   Vertebral Artery   [x]   Adsons   [x]   Verle Dusky   [x]       OBSERVATION No Deficit Deficit Not Tested Comments   Posture       Forward Head [] [x] []    Rounded Shoulders [] [x] []    Kyphosis [x] [] []    Lordosis [] [x] [] Decreased lumbar lordosis in sitting    Lateral Shift [] [] [x]    Scoliosis [x] [] []    Iliac Crest [] [] [x]    PSIS [] [] [x]    ASIS [] [] [x]    Genu Valgus [] [] [x]    Genu Varus [] [] [x]    Genu Recurvatum [] [] [x]    Pronation [] [] [x]    Supination [] [] [x]    Leg Length Discrp [] [] [x]    Slumped sitting [] [x] []    Palpation [] [x] [] Tenderness to palpation of the T4-T7 region lateral to the spinous process and along the rhomboid/middle trapezius musculature   R pectoral tenderness to palpation    Sensation [x] [] []    Edema [x] [] [] Neurological [x] [] []    Gait  [x] [] []        Flexibility Normal Left tight Right tight Comments    UT []    [x]    [x]    R>L   Scalenes []    []    []       L. Scap []    [x]    [x]       Pec Minor []    [x]    [x]    R>L         FUNCTION Normal Difficult Unable   Sitting [x] [] []   Standing [x] [] []   Ambulation [x] [] []   Groom/Dress [x] [] []   Lift/Carry [] [x] []   Stairs [x] [] []   Bending [x] [] []   OH reach [] [x] []   Sit to Stand [x] [] []       Functional Test: Neck Disability Index Score: 22% functionally impaired       Comments:      Assessment: 31 y/o male presents to PT clinic with mid back pain that has been present since 2019, starting months after a shoulder surgery. No other trauma or injury associated. He reports a sharp onset of pain a few weeks ago when he reached forward, causing him concerns and wanting it to get checked out. Patient demonstrates moderate forward head and rounded shoulders. R shoulder elevated at rest compared to the L. Tenderness to the L pectoral muscle group. Moderate weakness to the mid-back, lacking overall scapular endurance. Patient would benefit from skilled physical therapy services in order to: improve posture, improve scapular strength, and decrease pain to ease ADL's     Problems:    [x] ? Pain: moderate pain in mid-back  [x] ? ROM: Cervical tightness with L sidebend   [x] ? Strength: decreased scapular strength, R shoulder ER/IR strength   [] ? Function: 22% impairment on Neck Disability Index   [] Other:         Goals  MET NOT MET ON-  GOING  Details   Date Addressed:        STG: To be met in 6 treatments           1. ? Pain: Decrease back pain levels to 3/10 with ADLs []  []  []      2. ? ROM: Increase cervical AROM limitations throughout to equal bilat without pain to reduce difficulty with ADL's []  []  []      3.  Patient to demonstrate improved dynamic posture with minimal cueing throughout session to ease ADL's  []  []  []     4. ? Strength: Increase bilateral shoulder MMT to at least 5/5 throughout to ease functional limitations and mobility  []  []  []     5. Independent with Home Exercise Programs []  []  []      []  []  []      []  []  []     Date Addressed:        LTG: To be met in 12 treatments       1. Improve score on assessment tool Neck Disability Index from 22% impairment to less than 10% impairment  []  []  []     2. Reduce back pain levels to 0/10 or less with ADLs []  []  []      []  []  []                Patient goals: strengthen his back, return to work to provide for his family     Rehab Potential:  [x] Good  [] Fair  [] Poor   Suggested Professional Referral:  [x] No  [] Yes:  Barriers to Goal Achievement[de-identified]  [x] No  [] Yes:  Domestic Concerns:  [x] No  [] Yes:    Pt. Education:  [x] Plans/Goals, Risks/Benefits discussed  [x] Home exercise program  Method of Education: [x] Verbal  [x] Demo  [x] Written     Access Code: [email or web address]  URL: [email or web address]  Date: 05/31/2022  Prepared by: Kenia Ramirez    Exercises  Seated Upper Trapezius Stretch - 2 x daily - 7 x weekly - 3-5 sets - 30 (sec) hold  Seated Levator Scapulae Stretch - 2 x daily - 7 x weekly - 3-5 sets - 30 (sec) hold  Doorway Pec Stretch at 90 Degrees Abduction - 2 x daily - 7 x weekly - 3-5 sets - 30 (sec) hold  Standing Shoulder Row with Anchored Resistance - 1 x daily - 7 x weekly - 3 sets - 10 reps  Shoulder Extension with Resistance - 1 x daily - 7 x weekly - 3 sets - 10 reps  Standing Shoulder External Rotation with Resistance - 1 x daily - 7 x weekly - 3 sets - 10 reps      Comprehension of Education:  [x] Verbalizes understanding. [x] Demonstrates understanding. [x] Needs Review. [] Demonstrates/verbalizes understanding of HEP/Ed previously given.     Treatment Plan:  [x] Therapeutic Exercise   88895  [] Iontophoresis: 4 mg/mL Dexamethasone Sodium Phosphate  mAmin  16587   [] Therapeutic Activity  92480 [] Vasopneumatic cold with compression  [id number]    [] Gait Training   [id number] [] Ultrasound   [id number]   [] Neuromuscular Re-education  [phone] [] Electrical Stimulation Unattended  28878   [x] Manual Therapy  18400 [] Electrical Stimulation Attended  [id number]   [x] Instruction in HEP  [] Lumbar/Cervical Traction  [id number]   [] Aquatic Therapy   [id number] [] Cold/hotpack    [] Massage   19968      [] Dry Needling, 1 or 2 muscles  98340   [] Biofeedback, first 15 minutes   79788  [] Biofeedback, additional 15 minutes   87063 [] Dry Needling, 3 or more muscles  74220       []  Medication allergies reviewed for use of    Dexamethasone Sodium Phosphate 4mg/ml     with iontophoresis treatments. Pt is not allergic.     Frequency:  2 x/week for 12 visits      Todays Treatment:  Exercises:  Exercise    Mid-back pain Reps/ Time Weight/ Level Comments         Bike UE             Doorway Pectoral Stretch  x30\"           Supine Chin Tucks Next      Cervical AROM       Upper Trap Stretch  2x30\"     Levator Scap Stretch  2x30\"           Prone       Scap Retractions       Scap Depressions             Tband       Rows  x10 Blue     Extension  x10 Blue     Bilat ER  x10  Blue     Bilat horiz abd                 Specific Instructions for next treatment: prone strengthening, manual to correct thoracic dysfunction         Evaluation Complexity:  History (Personal factors, comorbidities) [] 0 [x] 1-2 [] 3+   Exam (limitations, restrictions) [] 1-2 [] 3 [x] 4+   Clinical presentation (progression) [x] Stable [] Evolving  [] Unstable   Decision Making [x] Low [] Moderate [] High    [x] Low Complexity [] Moderate Complexity [] High Complexity       Treatment Charges: Mins Units   [x] Evaluation       [x]  Low       []  Moderate       []  High 30 1   []  Modalities     [x]  Ther Exercise 10 1   []  Manual Therapy     []  Ther Activities     []  Aquatics     []  Vasocompression     []  Other         TOTAL TREATMENT TIME: 40 min       Time in: 10:15a      Time out: 10:55a      Electronically signed by: Brian Tamayo PT    Physician Signature:________________________________Date:__________________  By signing above or cosigning this note, I have reviewed this plan of care and certify a need for medically necessary rehabilitation services.      *PLEASE SIGN ABOVE AND FAX BACK ALL PAGES*

## 2022-06-06 ENCOUNTER — HOSPITAL ENCOUNTER (OUTPATIENT)
Dept: PHYSICAL THERAPY | Facility: CLINIC | Age: 32
Setting detail: THERAPIES SERIES
Discharge: HOME | End: 2022-06-06
Payer: MEDICARE

## 2022-06-06 PROCEDURE — 97110 THERAPEUTIC EXERCISES: CPT

## 2022-06-06 NOTE — FLOWSHEET NOTE
[x] [facility] [place] [facility]""  Outpatient Rehabilitation &  Therapy  [facility]   [place]: [phone]  F: [phone]      Physical Therapy Daily Treatment Note    Date:  2022  Patient Name:  Jyotsna Duckworth    :  1990  MRN: [id number]  Physician: Dr. Karla Delacruz: [employer] ( vs)   Medical Diagnosis: Chronic Back pain                      Rehab Codes: M54.5,  R29.3, M62.838, M62.81    Onset Date: referral 22                                                   Next 's appt.: n/a     Visit# / total visits:      Cancels/No Shows:     Subjective:    Pain:  [] Yes  [x] No Location: Upper back  Pain Rating: (0-10 scale) 0/10  Pain altered Tx:  [x] No  [] Yes  Action:  Comments: Patient arrived noting no change in symptoms with no complaint of pain upon arrival.     Objective:  Exercises:  Exercise     Mid-back pain Reps/ Time Weight/ Level Comments             Bike UE   10'                 Doorway Pectoral Stretch  x30\"                 Supine Chin Tucks  10x10\"       Cervical AROM          Upper Trap Stretch   2x30\"       Levator Scap Stretch   2x30\"                 Prone          Scap Retractions   10x10\"       Scap Depressions   10x10\"       Row  2x10 2#    Ext  2x10 2#    HAB  2x10 2#    Scaption  2x10 2#              Tband          Rows  2x10 Blue      Extension  2x10 Blue      Bilat ER  2x10  Blue      Bilat horiz abd  2x10 Blue                     Treatment Charges: Mins Units   []  Modalities     [x]  Ther Exercise 38 3   []  Manual Therapy     []  Ther Activities     []  Aquatics     []  Vasocompression     []  Other     Total Treatment time 38 3       Assessment: [x] Progressing toward goals. Progressed strength program this date. Patient notes no pain or soreness with progressions but notes increased fatigue in upper back. Patient with good control and minimal cues needed for UT compensation.  Visible fatigue noted towards end of program. Updated HEP with good comprehension. Will continue to monitor response to treatment and progress HEP per tolerance. [] No change. [] Other:  [x] Patient would continue to benefit from skilled physical therapy services in order to: improve posture, improve scapular strength, and decrease pain to ease ADL's        Goals  MET NOT MET ON-  GOING  Details   Date Addressed:            STG: To be met in 6 treatments  ?          1. ? Pain: Decrease back pain levels to 3/10 with ADLs []? ?  []??  []??      2. ? ROM: Increase cervical AROM limitations throughout to equal bilat without pain to reduce difficulty with ADL's []? ?  []??  []??      3. Patient to demonstrate improved dynamic posture with minimal cueing throughout session to ease ADL's  []? ?  []??  []??      4. ? Strength: Increase bilateral shoulder MMT to at least 5/5 throughout to ease functional limitations and mobility  []? ?  []??  []??      5. Independent with Home Exercise Programs []? ?  []??  []??        []? ?  []??  []??        []? ?  []??  []??      Date Addressed:            LTG: To be met in 12 treatments           1. Improve score on assessment tool Neck Disability Index from 22% impairment to less than 10% impairment  []??  []??  []??      2. Reduce back pain levels to 0/10 or less with ADLs []? ?  []??  []??        []? ?  []??  []??                      Patient goals: strengthen his back, return to work to provide for his family     Pt. Education:  [x] Yes  [] No  [x] Reviewed Prior HEP/Ed  Method of Education: [x] Verbal  [] Demo  [x] Written:  Access Code: [email or web address]  URL: [email or web address]  Date: 06/06/2022  Prepared by: Juanjo Hidalgo    Exercises  Supine Chin Tuck - 1 x daily - 7 x weekly - 10 reps - 3 sets - 3 second hold  Doorway Pec Stretch at 90 Degrees Abduction - 1 x daily - 7 x weekly - 3 sets - 30 (sec) hold  Prone program    Comprehension of Education:  [x] Verbalizes understanding.   [] Demonstrates understanding. [] Needs review. [x] Demonstrates/verbalizes HEP/Ed previously given. Plan: [x] Continue current frequency toward long and short term goals.     [x] Specific Instructions for subsequent treatments: prone strengthening, manual to correct thoracic dysfunction      Time In: 0913              Time Out: 1035    Electronically signed by:  Trell Douglas PTA

## 2022-06-10 ENCOUNTER — HOSPITAL ENCOUNTER (OUTPATIENT)
Dept: PHYSICAL THERAPY | Facility: CLINIC | Age: 32
Setting detail: THERAPIES SERIES
Discharge: HOME | End: 2022-06-10
Payer: MEDICARE

## 2022-06-10 PROCEDURE — 97110 THERAPEUTIC EXERCISES: CPT

## 2022-06-10 NOTE — FLOWSHEET NOTE
[x] [facility]  Outpatient Rehabilitation &  Therapy  [facility]   [place]: [phone]  F: [phone]      Physical Therapy Daily Treatment Note    Date:  6/10/2022  Patient Name:  Abdulaziz Baca    :  1990  MRN: [id number]  Physician: Dr. Gutiérrez Emperor: [employer] ( vs)   Medical Diagnosis: Chronic Back pain                      Rehab Codes: M54.5,  R29.3, M62.838, M62.81    Onset Date: referral 22                                                   Next 's appt.: n/a     Visit# / total visits: 3/12     Cancels/No Shows:     Subjective:    Pain:  [] Yes  [x] No Location: Upper back  Pain Rating: (0-10 scale) 0/10  Pain altered Tx:  [x] No  [] Yes  Action:  Comments: Patient arrived stating he has a decrease in pain and overall symptoms. Patient states he was performing his scapular depression exercise and it caused muscle spasms the entire day and he thinks he was pushing too hard. Objective:  Exercises:  Exercise     Mid-back pain Reps/ Time Weight/ Level Comments             Bike UE   10'                 Doorway Pectoral Stretch  x30\"  T/Y  HEP             Supine Chin Tucks  10x10\"    HEP   Upper Trap Stretch   2x30\"    HEP   Levator Scap Stretch   2x30\"    HEP             Prone          Scap Retractions   10x10\"    HEP   Scap Depressions   10x10\"    HEP   Row  2x10 5#    Ext  2x10 5#    HAB  2x10 2#    Scaption  2x10 2#              Tband          Rows  2x10 Blue   HEP   Extension  2x10 Blue   HEP   Bilat ER  2x10  Blue   HEP   Bilat horiz abd  2x10 Blue                     Treatment Charges: Mins Units   []  Modalities     [x]  Ther Exercise 38 3   []  Manual Therapy     []  Ther Activities     []  Aquatics     []  Vasocompression     []  Other     Total Treatment time 38 3       Assessment: [x] Progressing toward goals.  Patient demonstrates forward head with thera-band exercises requires cues to correct with fair carryover. Patient able to avoid upper trap compensation with all exercises requiring no cues. Advised patient to add pec minor stretch and mid-back stretch due to increased muscle tension. Will continue to progress postural strength next visit. [] No change. [] Other:  [x] Patient would continue to benefit from skilled physical therapy services in order to: improve posture, improve scapular strength, and decrease pain to ease ADL's        Goals  MET NOT MET ON-  GOING  Details   Date Addressed:            STG: To be met in 6 treatments  ?          1. ? Pain: Decrease back pain levels to 3/10 with ADLs []? ?  []??  []??      2. ? ROM: Increase cervical AROM limitations throughout to equal bilat without pain to reduce difficulty with ADL's []? ?  []??  []??      3. Patient to demonstrate improved dynamic posture with minimal cueing throughout session to ease ADL's  []? ?  []??  []??      4. ? Strength: Increase bilateral shoulder MMT to at least 5/5 throughout to ease functional limitations and mobility  []? ?  []??  []??      5. Independent with Home Exercise Programs []? ?  []??  []??        []? ?  []??  []??        []? ?  []??  []??      Date Addressed:            LTG: To be met in 12 treatments           1. Improve score on assessment tool Neck Disability Index from 22% impairment to less than 10% impairment  []??  []??  []??      2. Reduce back pain levels to 0/10 or less with ADLs []? ?  []??  []??        []? ?  []??  []??                      Patient goals: strengthen his back, return to work to provide for his family     Pt. Education:  [x] Yes  [] No  [x] Reviewed Prior HEP/Ed, Doorway pec minor stretch, mid-back stretch   Method of Education: [x] Verbal  [] Demo  [x] Written:  Access Code: [email or web address]  URL: [email or web address]  Date: 06/06/2022  Prepared by: Hay Christiansen    Exercises  Supine Chin Tuck - 1 x daily - 7 x weekly - 10 reps - 3 sets - 3 second hold  Doorway Pec Stretch at 90 Degrees Abduction - 1 x daily - 7 x weekly - 3 sets - 30 (sec) hold  Prone program    Comprehension of Education:  [x] Verbalizes understanding. [x] Demonstrates understanding. [] Needs review. [x] Demonstrates/verbalizes HEP/Ed previously given. Plan: [x] Continue current frequency toward long and short term goals. [x] Specific Instructions for subsequent treatments: manual to correct thoracic dysfunction prn, advance strength and stability.        Time In: 10:05am             Time Out: 10:53am    Electronically signed by:  Damian Paris PTA

## 2022-06-14 ENCOUNTER — HOSPITAL ENCOUNTER (OUTPATIENT)
Dept: PHYSICAL THERAPY | Facility: CLINIC | Age: 32
Setting detail: THERAPIES SERIES
Discharge: HOME | End: 2022-06-14
Payer: MEDICARE

## 2022-06-14 NOTE — FLOWSHEET NOTE
[x] [facility]) - [facility] &  Therapy  [address]    [phone]  F: [phone]   [] [address]  [facility]   [address]  P: [phone]  F: [phone]  [] [address] &  Therapy  [address] [facility]  P: [phone]  F: [phone] [] [address]  P: [phone]  F: [phone]  [] [address]  [facility]   [address]   [place]: [phone]  F: [phone]   [] [place] [id number] [facility] [address]  [place]: [phone]   F: [phone]     Physical Therapy Cancel/No Show note    Date: 2022  Patient: Ruben Bruno  : 1990  MRN: [id number]    Cancels/No Shows to date:     For today's appointment patient:    []  Cancelled    [] Rescheduled appointment    [x] No-show     Reason given by patient:    []  Patient ill    []  Conflicting appointment    [] No transportation      [] Conflict with work    [x] No reason given    [] Weather related    [] [id number]    [] Other:      Comments:        [] Next appointment was confirmed    Electronically signed by: Mikaela Mosqueda PT

## 2022-06-14 NOTE — FLOWSHEET NOTE
[] [facility] ([facility]) - [facility] &  Therapy  [address]    [phone]  F: [phone]   [] [address]  [facility] [address]   [address]  P: [phone]  F: [phone]  [] [address] &  Therapy  [address]  P: [phone]  F: [phone] [] [address]  P: [phone]  F: [phone]  [x] [address]  [address] [facility] [address]   [address]   [place]: [phone]  F: [phone]   [] [address]  [place]: [phone]   F: [phone]     Physical Therapy Cancel/No Show note    Date: 2022  Patient: [employer]upo Harrison  : 1990  MRN: [id number]    Cancels/No Shows to date: 1    For today's appointment patient:    [x]  Cancelled    [] Rescheduled appointment    [] No-show     Reason given by patient:    [x]  Patient ill    []  Conflicting appointment    [] No transportation      [] Conflict with work    [] No reason given    [] Weather related    [] COVID-19    [x] Other:      Comments: Stated he has a stomach bug or food related. Doesn't want to come in like that.        [] Next appointment was confirmed    Electronically signed by: Ricardo Tinsley

## 2023-11-02 ENCOUNTER — HOSPITAL ENCOUNTER (OUTPATIENT)
Age: 33
Setting detail: SPECIMEN
Discharge: HOME | End: 2023-11-02

## 2023-11-03 LAB
25(OH)D3 SERPL-MCNC: 27.8 NG/ML
ALBUMIN SERPL-MCNC: 4.9 G/DL (ref 3.5–5.2)
ALBUMIN/GLOB SERPL: 1.6 {RATIO} (ref 1–2.5)
ALP SERPL-CCNC: 50 U/L (ref 40–129)
ALT SERPL-CCNC: 19 U/L (ref 5–41)
ANION GAP SERPL CALCULATED.3IONS-SCNC: 10 MMOL/L (ref 9–17)
AST SERPL-CCNC: 18 U/L
BASOPHILS # BLD: <0.03 K/UL (ref 0–0.2)
BASOPHILS NFR BLD: 1 % (ref 0–2)
BILIRUB SERPL-MCNC: 1 MG/DL (ref 0.3–1.2)
BUN SERPL-MCNC: 19 MG/DL (ref 6–20)
CALCIUM SERPL-MCNC: 10.2 MG/DL (ref 8.6–10.4)
CHLORIDE SERPL-SCNC: 101 MMOL/L (ref 98–107)
CHOLEST SERPL-MCNC: 202 MG/DL
CHOLESTEROL/HDL RATIO: 4.2
CO2 SERPL-SCNC: 26 MMOL/L (ref 20–31)
CREAT SERPL-MCNC: 0.9 MG/DL (ref 0.7–1.2)
EOSINOPHIL # BLD: 0.16 K/UL (ref 0–0.44)
EOSINOPHILS RELATIVE PERCENT: 4 % (ref 1–4)
ERYTHROCYTE [DISTWIDTH] IN BLOOD BY AUTOMATED COUNT: 11.8 % (ref 11.8–14.4)
GFR SERPL CREATININE-BSD FRML MDRD: >60 ML/MIN/1.73M2
GLUCOSE SERPL-MCNC: 88 MG/DL (ref 70–99)
HCT VFR BLD AUTO: 49.6 % (ref 40.7–50.3)
HDLC SERPL-MCNC: 48 MG/DL
HGB BLD-MCNC: 16.3 G/DL (ref 13–17)
IMM GRANULOCYTES # BLD AUTO: <0.03 K/UL (ref 0–0.3)
IMM GRANULOCYTES NFR BLD: 1 %
LDLC SERPL CALC-MCNC: 135 MG/DL (ref 0–130)
LYMPHOCYTES NFR BLD: 1.66 K/UL (ref 1.1–3.7)
LYMPHOCYTES RELATIVE PERCENT: 41 % (ref 24–43)
MCH RBC QN AUTO: 30.5 PG (ref 25.2–33.5)
MCHC RBC AUTO-ENTMCNC: 32.9 G/DL (ref 28.4–34.8)
MCV RBC AUTO: 92.9 FL (ref 82.6–102.9)
MONOCYTES NFR BLD: 0.26 K/UL (ref 0.1–1.2)
MONOCYTES NFR BLD: 6 % (ref 3–12)
NEUTROPHILS NFR BLD: 47 % (ref 36–65)
NEUTS SEG NFR BLD: 1.94 K/UL (ref 1.5–8.1)
NRBC BLD-RTO: 0 PER 100 WBC
PLATELET # BLD AUTO: 341 K/UL (ref 138–453)
PMV BLD AUTO: 10.5 FL (ref 8.1–13.5)
POTASSIUM SERPL-SCNC: 5 MMOL/L (ref 3.7–5.3)
PROT SERPL-MCNC: 8 G/DL (ref 6.4–8.3)
RBC # BLD AUTO: 5.34 M/UL (ref 4.21–5.77)
SODIUM SERPL-SCNC: 137 MMOL/L (ref 135–144)
TRIGL SERPL-MCNC: 95 MG/DL
WBC OTHER # BLD: 4.1 K/UL (ref 3.5–11.3)

## (undated) DEVICE — GARMENT,MEDLINE,DVT,INT,CALF,MED, GEN2: Brand: MEDLINE

## (undated) DEVICE — TUBING, SUCTION, 1/4" X 12', STRAIGHT: Brand: MEDLINE

## (undated) DEVICE — [FOUR SPIKE IRRIGATOR SET,  NON-PYROGENIC FLUID PATH,  DO NOT USE IF PACKAGE IS DAMAGED]

## (undated) DEVICE — 4-PORT MANIFOLD: Brand: NEPTUNE 2

## (undated) DEVICE — SUPER TURBOVAC 90 INTEGRATED CABLE WAND ICW: Brand: COBLATION

## (undated) DEVICE — SOLUTION ANSEP 3% PEROXIDE 8OZ TOP NS LF

## (undated) DEVICE — Z INACTIVE USE 2660664 SOLUTION IRRIG 3000ML 0.9% SOD CHL USP UROMATIC PLAS CONT

## (undated) DEVICE — APPLICATOR MEDICATED 26 CC SOLUTION HI LT ORNG CHLORAPREP

## (undated) DEVICE — Device

## (undated) DEVICE — Z DISCONTINUED GLOVE SURG SZ 7.5 L12IN FNGR THK13MIL WHT ISOLEX

## (undated) DEVICE — QFIX 1.8 MINI SUTURE ANCHOR DISP KIT: Brand: QFIX

## (undated) DEVICE — ADHESIVE SKIN CLSR 0.7ML TOP DERMBND ADV

## (undated) DEVICE — SHOULDER SUSPENSION KIT 6 PER BOX

## (undated) DEVICE — POSITIONER HD W8XH4XL8.5IN RASPBERRY FOAM SLT

## (undated) DEVICE — YANKAUER,FLEXIBLE HANDLE,REGLR CAPACITY: Brand: MEDLINE INDUSTRIES, INC.

## (undated) DEVICE — CLEAR-TRAC THREADED CANNULA WITH                                    OBTURATOR 5 MM X 76 MM, LATEX FREE,                                    BOX OF 10: Brand: CLEAR-TRAC

## (undated) DEVICE — BLANKET WRM W40.2XL55.9IN IORT LO BODY + MISTRAL AIR

## (undated) DEVICE — [AGGRESSIVE PLUS CUTTER, ARTHROSCOPIC SHAVER BLADE,  DO NOT RESTERILIZE,  DO NOT USE IF PACKAGE IS DAMAGED,  KEEP DRY,  KEEP AWAY FROM SUNLIGHT]: Brand: FORMULA

## (undated) DEVICE — SOLUTION IV 1000ML 0.9% SOD CHL PH 5 INJ USP VIAFLX PLAS

## (undated) DEVICE — COVER,MAYO STAND,XL,STERILE: Brand: MEDLINE

## (undated) DEVICE — GRASPER SUT 30DEG SHRP TIP LO PROF FOR RAP ACCS IN SHLDR

## (undated) DEVICE — CANNULA THREADED DISP 8.5 X 72MM: Brand: CLEAR-TRAC